# Patient Record
Sex: MALE | Race: WHITE | NOT HISPANIC OR LATINO | Employment: FULL TIME | URBAN - METROPOLITAN AREA
[De-identification: names, ages, dates, MRNs, and addresses within clinical notes are randomized per-mention and may not be internally consistent; named-entity substitution may affect disease eponyms.]

---

## 2019-01-04 ENCOUNTER — TRANSCRIBE ORDERS (OUTPATIENT)
Dept: ADMINISTRATIVE | Facility: HOSPITAL | Age: 54
End: 2019-01-04

## 2019-01-04 ENCOUNTER — APPOINTMENT (OUTPATIENT)
Dept: RADIOLOGY | Facility: CLINIC | Age: 54
End: 2019-01-04
Payer: COMMERCIAL

## 2019-01-04 DIAGNOSIS — R52 PAIN: Primary | ICD-10-CM

## 2019-01-04 DIAGNOSIS — R52 PAIN: ICD-10-CM

## 2019-01-04 PROCEDURE — 71101 X-RAY EXAM UNILAT RIBS/CHEST: CPT

## 2019-10-17 ENCOUNTER — APPOINTMENT (OUTPATIENT)
Dept: RADIOLOGY | Facility: CLINIC | Age: 54
End: 2019-10-17
Payer: COMMERCIAL

## 2019-10-17 ENCOUNTER — TRANSCRIBE ORDERS (OUTPATIENT)
Dept: ADMINISTRATIVE | Facility: HOSPITAL | Age: 54
End: 2019-10-17

## 2019-10-17 DIAGNOSIS — R05.9 COUGH: Primary | ICD-10-CM

## 2019-10-17 DIAGNOSIS — R05.9 COUGH: ICD-10-CM

## 2019-10-17 PROCEDURE — 71046 X-RAY EXAM CHEST 2 VIEWS: CPT

## 2021-02-17 ENCOUNTER — OFFICE VISIT (OUTPATIENT)
Dept: GASTROENTEROLOGY | Facility: CLINIC | Age: 56
End: 2021-02-17
Payer: COMMERCIAL

## 2021-02-17 VITALS
HEART RATE: 78 BPM | WEIGHT: 188.2 LBS | SYSTOLIC BLOOD PRESSURE: 140 MMHG | HEIGHT: 70 IN | BODY MASS INDEX: 26.94 KG/M2 | DIASTOLIC BLOOD PRESSURE: 96 MMHG

## 2021-02-17 DIAGNOSIS — R10.30 LOWER ABDOMINAL PAIN: ICD-10-CM

## 2021-02-17 DIAGNOSIS — Z86.010 HISTORY OF COLON POLYPS: Primary | ICD-10-CM

## 2021-02-17 PROCEDURE — 99243 OFF/OP CNSLTJ NEW/EST LOW 30: CPT | Performed by: INTERNAL MEDICINE

## 2021-02-17 RX ORDER — SERTRALINE HYDROCHLORIDE 100 MG/1
100 TABLET, FILM COATED ORAL DAILY
COMMUNITY
Start: 2021-01-25

## 2021-02-17 RX ORDER — ALPRAZOLAM 0.5 MG/1
0.5 TABLET ORAL AS NEEDED
COMMUNITY
Start: 2021-02-15

## 2021-02-17 NOTE — H&P (VIEW-ONLY)
[facility] - Outpatient Consultation  Jewels Holland 48 y o  male MRN: [id number]  Encounter: [id number]    ASSESSMENT AND PLAN:      1  History of colon polyps    Overdue for surveillance colonoscopy I would recommend scheduling this now  2  Lower abdominal pain    Vague lower abdominal discomfort worse with seeds and nuts better with yogurt  Improved with defecation  My index of suspicion for something serious is rather low  He is scheduled for colonoscopy  Diverticulosis is a possibility  I did recommend he resume taking fiber supplements and went over a high-fiber diet with him  Followup Appointment:   As needed  ______________________________________________________________________    Chief Complaint   Patient presents with    Abdominal Pain     left side    Colonoscopy       HPI:   Jewels Holland is a 48y o  year old male With a history of serrated adenoma of the colon 2013  He reports vague periumbilical discomfort  This seems to come and go worse with seeds and nuts better with yogurt  No weight loss  No significant rectal bleeding although he does occasionally notice small amounts of bright red blood on the toilet paper with wiping  No upper tract symptoms  Historical Information   Past Medical History:   Diagnosis Date    Anxiety     Anxiety      History reviewed  No pertinent surgical history    Social History     Substance and Sexual Activity   Alcohol Use Yes    Frequency: 4 or more times a week     Social History     Substance and Sexual Activity   Drug Use Not on file     Social History     Tobacco Use   Smoking Status Never Smoker   Smokeless Tobacco Never Used     Family History   Problem Relation Age of Onset    Dementia Father     No Known Problems Sister     No Known Problems Brother     Colon cancer Maternal Uncle     No Known Problems Sister     No Known Problems Sister     No Known Problems Brother     No Known Problems Brother     No Known Problems Brother        Meds/Allergies     Current Outpatient Medications:     ALPRAZolam (XANAX) 0 5 mg tablet    sertraline (ZOLOFT) 100 mg tablet    No Known Allergies    PHYSICAL EXAM:    Blood pressure 140/96, pulse 78, height 5' 10" (1 778 m), weight 85 4 kg (188 lb 3 2 oz)  Body mass index is 27 kg/m²  General Appearance: NAD, cooperative, alert  Eyes: Anicteric, PERRLA, EOMI  ENT:  Normocephalic, atraumatic, normal mucosa  Neck:  Supple, symmetrical, trachea midline,   Resp:  Clear to auscultation bilaterally; no rales, rhonchi or wheezing; respirations unlabored   CV:  S1 S2, Regular rate and rhythm; no murmur, rub, or gallop  GI:  Soft, non-tender, non-distended; normal bowel sounds; no masses, no organomegaly   Rectal: Deferred  Musculoskeletal: No cyanosis, clubbing or edema  Normal ROM  Skin:  No jaundice, rashes, or lesions   Heme/Lymph: No palpable cervical lymphadenopathy  Psych: Normal affect, good eye contact  Neuro: No gross deficits, AAOx3    Lab Results:   No results found for: WBC, HGB, HCT, MCV, PLT  No results found for: NA, K, CL, CO2, ANIONGAP, BUN, CREATININE, GLUCOSE, GLUF, CALCIUM, CORRECTEDCA, AST, ALT, ALKPHOS, PROT, BILITOT, EGFR  No results found for: IRON, TIBC, FERRITIN  No results found for: LIPASE    Radiology Results:   No results found  REVIEW OF SYSTEMS:    CONSTITUTIONAL: Denies any fever, chills, rigors, and weight loss  HEENT: No earache or tinnitus  Denies hearing loss or visual disturbances  CARDIOVASCULAR: No chest pain or palpitations  RESPIRATORY: Denies any cough, hemoptysis, shortness of breath or dyspnea on exertion  GASTROINTESTINAL: As noted in the History of Present Illness  GENITOURINARY: No problems with urination  Denies any hematuria or dysuria  NEUROLOGIC: No dizziness or vertigo, denies headaches  MUSCULOSKELETAL: Denies any muscle or joint pain  SKIN: Denies skin rashes or itching     ENDOCRINE: Denies excessive thirst  Denies intolerance to heat or cold  PSYCHOSOCIAL: Denies depression or anxiety  Denies any recent memory loss

## 2021-02-17 NOTE — PROGRESS NOTES
[id number] - Outpatient Consultation  Carmen Morales 48 y o  male MRN: [id number]  Encounter: [id number]    ASSESSMENT AND PLAN:      1  History of colon polyps    Overdue for surveillance colonoscopy I would recommend scheduling this now  2  Lower abdominal pain    Vague lower abdominal discomfort worse with seeds and nuts better with yogurt  Improved with defecation  My index of suspicion for something serious is rather low  He is scheduled for colonoscopy  Diverticulosis is a possibility  I did recommend he resume taking fiber supplements and went over a high-fiber diet with him  Followup Appointment:   As needed  ______________________________________________________________________    Chief Complaint   Patient presents with    Abdominal Pain     left side    Colonoscopy       HPI:   Carmen Morales is a 48y o  year old male With a history of serrated adenoma of the colon 2013  He reports vague periumbilical discomfort  This seems to come and go worse with seeds and nuts better with yogurt  No weight loss  No significant rectal bleeding although he does occasionally notice small amounts of bright red blood on the toilet paper with wiping  No upper tract symptoms  Historical Information   Past Medical History:   Diagnosis Date    Anxiety     Anxiety      History reviewed  No pertinent surgical history    Social History     Substance and Sexual Activity   Alcohol Use Yes    Frequency: 4 or more times a week     Social History     Substance and Sexual Activity   Drug Use Not on file     Social History     Tobacco Use   Smoking Status Never Smoker   Smokeless Tobacco Never Used     Family History   Problem Relation Age of Onset    Dementia Father     No Known Problems Sister     No Known Problems Brother     Colon cancer Maternal Uncle     No Known Problems Sister     No Known Problems Sister     No Known Problems Brother     No Known Problems Brother     No Known Problems Brother        Meds/Allergies     Current Outpatient Medications:     ALPRAZolam (XANAX) 0 5 mg tablet    sertraline (ZOLOFT) 100 mg tablet    No Known Allergies    PHYSICAL EXAM:    Blood pressure 140/96, pulse 78, height 5' 10" (1 778 m), weight 85 4 kg (188 lb 3 2 oz)  Body mass index is 27 kg/m²  General Appearance: NAD, cooperative, alert  Eyes: Anicteric, PERRLA, EOMI  ENT:  Normocephalic, atraumatic, normal mucosa  Neck:  Supple, symmetrical, trachea midline,   Resp:  Clear to auscultation bilaterally; no rales, rhonchi or wheezing; respirations unlabored   CV:  S1 S2, Regular rate and rhythm; no murmur, rub, or gallop  GI:  Soft, non-tender, non-distended; normal bowel sounds; no masses, no organomegaly   Rectal: Deferred  Musculoskeletal: No cyanosis, clubbing or edema  Normal ROM  Skin:  No jaundice, rashes, or lesions   Heme/Lymph: No palpable cervical lymphadenopathy  Psych: Normal affect, good eye contact  Neuro: No gross deficits, AAOx3    Lab Results:   No results found for: WBC, HGB, HCT, MCV, PLT  No results found for: NA, K, CL, CO2, ANIONGAP, BUN, CREATININE, GLUCOSE, GLUF, CALCIUM, CORRECTEDCA, AST, ALT, ALKPHOS, PROT, BILITOT, EGFR  No results found for: IRON, TIBC, FERRITIN  No results found for: LIPASE    Radiology Results:   No results found  REVIEW OF SYSTEMS:    CONSTITUTIONAL: Denies any fever, chills, rigors, and weight loss  HEENT: No earache or tinnitus  Denies hearing loss or visual disturbances  CARDIOVASCULAR: No chest pain or palpitations  RESPIRATORY: Denies any cough, hemoptysis, shortness of breath or dyspnea on exertion  GASTROINTESTINAL: As noted in the History of Present Illness  GENITOURINARY: No problems with urination  Denies any hematuria or dysuria  NEUROLOGIC: No dizziness or vertigo, denies headaches  MUSCULOSKELETAL: Denies any muscle or joint pain  SKIN: Denies skin rashes or itching     ENDOCRINE: Denies excessive thirst  Denies intolerance to heat or cold  PSYCHOSOCIAL: Denies depression or anxiety  Denies any recent memory loss

## 2021-03-08 VITALS — WEIGHT: 185 LBS | HEIGHT: 70 IN | BODY MASS INDEX: 26.48 KG/M2

## 2021-03-08 DIAGNOSIS — Z86.010 HISTORY OF COLON POLYPS: Primary | ICD-10-CM

## 2021-03-08 RX ORDER — SODIUM PICOSULFATE, MAGNESIUM OXIDE, AND ANHYDROUS CITRIC ACID 10; 3.5; 12 MG/160ML; G/160ML; G/160ML
LIQUID ORAL
Qty: 2 BOTTLE | Refills: 0 | Status: SHIPPED | OUTPATIENT
Start: 2021-03-08 | End: 2021-03-12 | Stop reason: HOSPADM

## 2021-03-08 NOTE — TELEPHONE ENCOUNTER
Why does your doctor want you to have this procedure? Hx polyps    Do you have kidney disease?  no  If yes, are you on dialysis :     Have you had diverticulitis within the past 2 months? no    Are you diabetic?  no  If yes, insulin dependent: If yes, provide diabetic instructions sheet     Do take iron supplements?  no  If yes, instruct patient to hold iron supplement for 7 days prior    Are you on a blood thinner? no   Was the blood thinner sheet complete and faxed to cardiologist no  Plavix (clopidogrel), Coumadin (warfarin), Lovenox (enoxaparin), Xarelto (rivaroxaban), Pradaxa(dabigatran), Eliquis(apixaban) Savaysa/Lixiana (edoxapan)    Do you have an automatic implantable cardiac defibrillator (AICD)/pacemaker ([facility])? no  Was AICD/pacemaker sheet completed and faxed to cardiologist? no    Are you on home oxygen? no  If yes, continuous or nocturnal:     Have you been treated for MRSA, VRE or any communicable diseases? no    Heart attack, stroke, or stent within 3 months? no  Schedule at Hospital if within 3-6 months   Use nitroglycerin for chest pain in the last 6 months? no    History of organ  transplant?  no   If yes, notify Endo      History of neck/throat/tongue surgery or cancer? no  IF yes, notify Endo      Any problems with anesthesia in the past? no     Was stool C diff ordered?  no Stool specimen needs to be completed prior to procedure    Do have any facial or body piercings?no     Do you have a latex allergy? no     Do have an allergy to metals? (Bravo study only) no     If pediatric patient, was consent faxed to pediatrician no     Patient rights reviewed yes     Colon phone prep completed; clenpiq instructions reviewed and emailed to pt; rx forwarded to provider

## 2021-03-09 NOTE — TELEPHONE ENCOUNTER
Pt's wife, Eyal Vo, states prep cost is $98/asked for alternative  Offered sample  Pt to  Clenpiq tomorrow

## 2021-03-12 ENCOUNTER — ANESTHESIA EVENT (OUTPATIENT)
Dept: GASTROENTEROLOGY | Facility: AMBULATORY SURGERY CENTER | Age: 56
End: 2021-03-12

## 2021-03-12 ENCOUNTER — ANESTHESIA (OUTPATIENT)
Dept: GASTROENTEROLOGY | Facility: AMBULATORY SURGERY CENTER | Age: 56
End: 2021-03-12

## 2021-03-12 ENCOUNTER — HOSPITAL ENCOUNTER (OUTPATIENT)
Dept: GASTROENTEROLOGY | Facility: AMBULATORY SURGERY CENTER | Age: 56
Discharge: HOME | End: 2021-03-12
Payer: COMMERCIAL

## 2021-03-12 VITALS
TEMPERATURE: 98 F | HEART RATE: 68 BPM | RESPIRATION RATE: 19 BRPM | SYSTOLIC BLOOD PRESSURE: 146 MMHG | OXYGEN SATURATION: 99 % | DIASTOLIC BLOOD PRESSURE: 95 MMHG

## 2021-03-12 DIAGNOSIS — Z86.010 HISTORY OF COLON POLYPS: ICD-10-CM

## 2021-03-12 DIAGNOSIS — R10.30 LOWER ABDOMINAL PAIN: ICD-10-CM

## 2021-03-12 PROCEDURE — 45380 COLONOSCOPY AND BIOPSY: CPT | Performed by: INTERNAL MEDICINE

## 2021-03-12 PROCEDURE — 88305 TISSUE EXAM BY PATHOLOGIST: CPT | Performed by: PATHOLOGY

## 2021-03-12 RX ORDER — PROPOFOL 10 MG/ML
INJECTION, EMULSION INTRAVENOUS AS NEEDED
Status: DISCONTINUED | OUTPATIENT
Start: 2021-03-12 | End: 2021-03-12

## 2021-03-12 RX ORDER — SODIUM CHLORIDE 9 MG/ML
50 INJECTION, SOLUTION INTRAVENOUS CONTINUOUS
Status: DISCONTINUED | OUTPATIENT
Start: 2021-03-12 | End: 2021-03-16 | Stop reason: HOSPADM

## 2021-03-12 RX ADMIN — PROPOFOL 50 MG: 10 INJECTION, EMULSION INTRAVENOUS at 09:42

## 2021-03-12 RX ADMIN — SODIUM CHLORIDE 50 ML/HR: 9 INJECTION, SOLUTION INTRAVENOUS at 09:32

## 2021-03-12 RX ADMIN — PROPOFOL 50 MG: 10 INJECTION, EMULSION INTRAVENOUS at 09:50

## 2021-03-12 RX ADMIN — PROPOFOL 150 MG: 10 INJECTION, EMULSION INTRAVENOUS at 09:39

## 2021-03-12 RX ADMIN — PROPOFOL 100 MG: 10 INJECTION, EMULSION INTRAVENOUS at 09:46

## 2021-03-12 NOTE — DISCHARGE INSTRUCTIONS
Hemorrhoids   WHAT YOU NEED TO KNOW:   What are hemorrhoids? Hemorrhoids are swollen blood vessels inside your rectum (internal hemorrhoids) or on your anus (external hemorrhoids)  Sometimes a hemorrhoid may prolapse  This means it extends out of your anus  What increases my risk for hemorrhoids? · Pregnancy or obesity    · Straining or sitting for a long time during bowel movements    · Liver disease    · Weak muscles around the anus caused by older age, rectal surgery, or anal intercourse    · A lack of physical activity    · Chronic diarrhea or constipation    · A low-fiber diet    What are the signs and symptoms of hemorrhoids? · Pain or itching around your anus or inside your rectum    · Swelling or bumps around your anus    · Bright red blood in your bowel movement, on the toilet paper, or in the toilet bowl    · Tissue bulging out of your anus (prolapsed hemorrhoids)    · Incontinence (poor control over urine or bowel movements)    How are hemorrhoids diagnosed? Your healthcare provider will ask about your symptoms, the foods you eat, and your bowel movements  He or she will examine your anus for external hemorrhoids  You may need the following:  · A digital rectal exam  is a test to check for hemorrhoids  Your healthcare provider will put a gloved finger inside your anus to feel for the hemorrhoids  · An anoscopy  is a test that uses a scope (small tube with a light and camera on the end) to look at your hemorrhoids  How are hemorrhoids treated? Treatment will depend on your symptoms  You may need any of the following:  · Medicines  can help decrease pain and swelling, and soften your bowel movement  The medicine may be a pill, pad, cream, or ointment  · Procedures  may be used to shrink or remove your hemorrhoid  Examples include rubber-band ligation, sclerotherapy, and photocoagulation  These procedures may be done in your healthcare provider's office   Ask your healthcare provider for more information about these procedures  · Surgery  may be needed to shrink or remove your hemorrhoids  How can I manage my symptoms? · Apply ice on your anus for 15 to 20 minutes every hour or as directed  Use an ice pack, or put crushed ice in a plastic bag  Cover it with a towel before you apply it to your anus  Ice helps prevent tissue damage and decreases swelling and pain  · Take a sitz bath  Fill a bathtub with 4 to 6 inches of warm water  You may also use a sitz bath pan that fits inside a toilet bowl  Sit in the sitz bath for 15 minutes  Do this 3 times a day, and after each bowel movement  The warm water can help decrease pain and swelling  · Keep your anal area clean  Gently wash the area with warm water daily  Soap may irritate the area  After a bowel movement, wipe with moist towelettes or wet toilet paper  Dry toilet paper can irritate the area  How can I help prevent hemorrhoids? · Do not strain to have a bowel movement  Do not sit on the toilet too long  These actions can increase pressure on the tissues in your rectum and anus  · Drink plenty of liquids  Liquids can help prevent constipation  Ask how much liquid to drink each day and which liquids are best for you  · Eat a variety of high-fiber foods  Examples include fruits, vegetables, and whole grains  Ask your healthcare provider how much fiber you need each day  You may need to take a fiber supplement  · Exercise as directed  Exercise, such as walking, may make it easier to have a bowel movement  Ask your healthcare provider to help you create an exercise plan  · Do not have anal sex  Anal sex can weaken the skin around your rectum and anus  · Avoid heavy lifting  This can cause straining and increase your risk for another hemorrhoid  When should I seek immediate care? · You have severe pain in your rectum or around your anus      · You have severe pain in your abdomen and you are vomiting  · You have bleeding from your anus that soaks through your underwear  When should I contact my healthcare provider? · You have frequent and painful bowel movements  · Your hemorrhoid looks or feels more swollen than usual      · You do not have a bowel movement for 2 days or more  · You see or feel tissue coming through your anus  · You have questions or concerns about your condition or care  CARE AGREEMENT:   You have the right to help plan your care  Learn about your health condition and how it may be treated  Discuss treatment options with your healthcare providers to decide what care you want to receive  You always have the right to refuse treatment  The above information is an  only  It is not intended as medical advice for individual conditions or treatments  Talk to your doctor, nurse or pharmacist before following any medical regimen to see if it is safe and effective for you  © Copyright [address] Information is for End User's use only and may not be sold, redistributed or otherwise used for commercial purposes  All illustrations and images included in CareNotes® are the copyrighted property of [employer]  or [address] [place] [address] [place]   Diverticulosis   WHAT YOU NEED TO KNOW:   What is diverticulosis? Diverticulosis is a condition that causes small pockets called diverticula to form in your intestine  These pockets make it difficult for bowel movements to pass through your digestive system  What causes diverticulosis? Diverticula form when muscles have to work hard to move bowel movements through the intestine  The force causes bulges to form at weak areas in the intestine  This may happen if you eat foods that are low in fiber  Fiber helps give your bowel movements more bulk so they are larger and easier to move through your colon   The following may increase your risk of diverticulosis:  · A history of constipation    · Age 36 or older    · Obesity    · Lack of exercise    What are the signs and symptoms of diverticulosis? Diverticulosis usually does not cause any signs or symptoms  It may cause any of the following in some people:  · Pain or discomfort in your lower abdomen    · Abdominal bloating    · Constipation or diarrhea    How is diverticulosis diagnosed? Your healthcare provider will examine you and ask about your bowel movements, diet, and symptoms  He or she will also ask about any medical conditions you have or medicines you take  You may need any of the following:  · Blood tests  may be done to check for signs of inflammation  · A barium enema  is an x-ray of your colon that may show diverticula  A tube is put into your anus, and a liquid called barium is put through the tube  Barium is used so that healthcare providers can see your colon more clearly  · Flexible sigmoidoscopy  is a test to look for any changes in your lower intestines and rectum  It may also show the cause of any bleeding or pain  A soft, bendable tube with a light on the end will be put into your anus  It will then be moved forward into your intestine  · A colonoscopy  is used to look at your whole colon  A scope (long bendable tube with a light on the end) is used to take pictures  This test may show diverticula  · A CT scan , or CAT scan, may show diverticula  You may be given contrast liquid before the scan  Tell the healthcare provider if you have ever had an allergic reaction to contrast liquid  How is diverticulosis managed? The goal of treatment is to manage any symptoms you have and prevent other problems such as diverticulitis  Diverticulitis is swelling or infection of the diverticula  Your healthcare provider may recommend any of the following:  · Eat a variety of high-fiber foods  High-fiber foods help you have regular bowel movements  High-fiber foods include cooked beans, fruits, vegetables, and some cereals   Most adults need 25 to 35 grams of fiber each day  Your healthcare provider may recommend that you have more  Ask your healthcare provider how much fiber you need  Increase fiber slowly  You may have abdominal discomfort, bloating, and gas if you add fiber to your diet too quickly  You may need to take a fiber supplement if you are not getting enough fiber from food  · Medicines  to soften your bowel movements may be given  You may also need medicines to treat symptoms such as bloating and pain  · Drink liquids as directed  You may need to drink 2 to 3 liters (8 to 12 cups) of liquids every day  Ask your healthcare provider how much liquid to drink each day and which liquids are best for you  · Apply heat  on your abdomen for 20 to 30 minutes every 2 hours for as many days as directed  Heat helps decrease pain and muscle spasms  How can I help prevent diverticulitis or other symptoms? The following may help decrease your risk for diverticulitis or symptoms, such as bleeding  Talk to your provider about these or other things you can do to prevent problems that may occur with diverticulosis  · Exercise regularly  Ask your healthcare provider about the best exercise plan for you  Exercise can help you have regular bowel movements  Get 30 minutes of exercise on most days of the week  · Maintain a healthy weight  Ask your healthcare provider how much you should weigh  Ask him or her to help you create a weight loss plan if you are overweight  · Do not smoke  Nicotine and other chemicals in cigarettes increase your risk for diverticulitis  Ask your healthcare provider for information if you currently smoke and need help to quit  E-cigarettes or smokeless tobacco still contain nicotine  Talk to your healthcare provider before you use these products  · Ask your healthcare provider if it is safe to take NSAIDs  NSAIDs may increase your risk of diverticulitis  When should I seek immediate care? · You have severe pain on the left side of your lower abdomen  · Your bowel movements are bright or dark red  When should I contact my healthcare provider? · You have a fever and chills  · You feel dizzy or lightheaded  · You have nausea, or you are vomiting  · You have a change in your bowel movements  · You have questions or concerns about your condition or care  CARE AGREEMENT:   You have the right to help plan your care  Learn about your health condition and how it may be treated  Discuss treatment options with your healthcare providers to decide what care you want to receive  You always have the right to refuse treatment  The above information is an  only  It is not intended as medical advice for individual conditions or treatments  Talk to your doctor, nurse or pharmacist before following any medical regimen to see if it is safe and effective for you  © Copyright [address] Information is for End User's use only and may not be sold, redistributed or otherwise used for commercial purposes   All illustrations and images included in CareNotes® are the copyrighted property of [employer]  or [address] [place] [address]

## 2021-03-12 NOTE — ANESTHESIA POSTPROCEDURE EVALUATION
Post-Op Assessment Note    CV Status:  Stable  Pain Score: 0    Pain management: adequate     Mental Status:  Alert and awake   Hydration Status:  Euvolemic   PONV Controlled:  Controlled   Airway Patency:  Patent      Post Op Vitals Reviewed: Yes      Staff: Anesthesiologist         No complications documented      /63 (03/12/21 0958)    Temp      Pulse 71 (03/12/21 0958)   Resp 15 (03/12/21 0958)    SpO2 98 % (03/12/21 0958)

## 2021-03-12 NOTE — INTERVAL H&P NOTE
H&P reviewed  After examining the patient I find no changes in the patients condition since the H&P had been written      Vitals:    03/12/21 0920   BP: 158/91   Pulse: 88   Resp: 22   Temp: 98 °F (36 7 °C)   SpO2: 99%

## 2021-03-12 NOTE — ANESTHESIA PREPROCEDURE EVALUATION
Procedure:  COLONOSCOPY    Relevant Problems   NEURO/PSYCH   (+) Anxiety      Other   (+) Colon polyp        Physical Exam    Airway    Mallampati score: III  TM Distance: >3 FB  Neck ROM: full     Dental   Comment: caps,     Cardiovascular  Rhythm: regular, Rate: normal,     Pulmonary  Pulmonary exam normal Breath sounds clear to auscultation,     Other Findings        Anesthesia Plan  ASA Score- 2     Anesthesia Type- IV sedation with anesthesia with ASA Monitors  Additional Monitors:   Airway Plan:           Plan Factors-    Chart reviewed  Patient is not a current smoker  Induction- intravenous  Postoperative Plan-     Informed Consent- Anesthetic plan and risks discussed with patient

## 2021-03-19 NOTE — RESULT ENCOUNTER NOTE
Biopsies negative  I left a voicemail for the patient  Five year colonoscopy recall history of polyps

## 2021-04-21 ENCOUNTER — IMMUNIZATIONS (OUTPATIENT)
Dept: FAMILY MEDICINE CLINIC | Facility: HOSPITAL | Age: 56
End: 2021-04-21

## 2021-04-21 DIAGNOSIS — Z23 ENCOUNTER FOR IMMUNIZATION: Primary | ICD-10-CM

## 2021-04-21 PROCEDURE — 0001A SARS-COV-2 / COVID-19 MRNA VACCINE (PFIZER-BIONTECH) 30 MCG: CPT

## 2021-04-21 PROCEDURE — 91300 SARS-COV-2 / COVID-19 MRNA VACCINE (PFIZER-BIONTECH) 30 MCG: CPT

## 2021-05-17 ENCOUNTER — IMMUNIZATIONS (OUTPATIENT)
Dept: FAMILY MEDICINE CLINIC | Facility: HOSPITAL | Age: 56
End: 2021-05-17

## 2021-05-17 DIAGNOSIS — Z23 ENCOUNTER FOR IMMUNIZATION: Primary | ICD-10-CM

## 2021-05-17 PROCEDURE — 91300 SARS-COV-2 / COVID-19 MRNA VACCINE (PFIZER-BIONTECH) 30 MCG: CPT

## 2021-05-17 PROCEDURE — 0002A SARS-COV-2 / COVID-19 MRNA VACCINE (PFIZER-BIONTECH) 30 MCG: CPT

## 2021-06-19 ENCOUNTER — HOSPITAL ENCOUNTER (EMERGENCY)
Facility: HOSPITAL | Age: 56
Discharge: HOME | End: 2021-06-19
Attending: EMERGENCY MEDICINE | Admitting: EMERGENCY MEDICINE
Payer: COMMERCIAL

## 2021-06-19 ENCOUNTER — APPOINTMENT (EMERGENCY)
Dept: CT IMAGING | Facility: HOSPITAL | Age: 56
End: 2021-06-19
Payer: COMMERCIAL

## 2021-06-19 VITALS
DIASTOLIC BLOOD PRESSURE: 81 MMHG | OXYGEN SATURATION: 97 % | WEIGHT: 186.07 LBS | HEART RATE: 83 BPM | SYSTOLIC BLOOD PRESSURE: 163 MMHG | TEMPERATURE: 99.3 F | BODY MASS INDEX: 26.7 KG/M2 | RESPIRATION RATE: 16 BRPM

## 2021-06-19 DIAGNOSIS — S09.90XA INJURY OF HEAD, INITIAL ENCOUNTER: ICD-10-CM

## 2021-06-19 DIAGNOSIS — M62.838 TRAPEZIUS MUSCLE SPASM: ICD-10-CM

## 2021-06-19 DIAGNOSIS — E04.1 THYROID NODULE: ICD-10-CM
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
## 2021-06-19 DIAGNOSIS — M54.2 NECK PAIN: ICD-10-CM

## 2021-06-19 DIAGNOSIS — S00.81XA ABRASION OF FOREHEAD, INITIAL ENCOUNTER: ICD-10-CM

## 2021-06-19 PROCEDURE — 70450 CT HEAD/BRAIN W/O DYE: CPT

## 2021-06-19 PROCEDURE — 99282 EMERGENCY DEPT VISIT SF MDM: CPT | Performed by: PHYSICIAN ASSISTANT

## 2021-06-19 PROCEDURE — 99284 EMERGENCY DEPT VISIT MOD MDM: CPT

## 2021-06-19 PROCEDURE — 72125 CT NECK SPINE W/O DYE: CPT

## 2021-06-19 RX ORDER — BACITRACIN, NEOMYCIN, POLYMYXIN B 400; 3.5; 5 [USP'U]/G; MG/G; [USP'U]/G
1 OINTMENT TOPICAL ONCE
Status: COMPLETED | OUTPATIENT
Start: 2021-06-19 | End: 2021-06-19

## 2021-06-19 RX ADMIN — BACITRACIN ZINC, NEOMYCIN, POLYMYXIN B SULFAT 1 SMALL APPLICATION: 5000; 3.5; 4 OINTMENT TOPICAL at 22:22

## 2021-06-20 NOTE — DISCHARGE INSTRUCTIONS
Area clean and dry  Gently wash with soap and water  Apply Neosporin 2 times a day  Keep the area covered and protected from the sun  Continue Tylenol or ibuprofen at home as needed for pain  Can apply ice to the area as needed for pain, swelling  Continue previously prescribed Flexeril as needed for muscle spasms    Stay hydrated, drink plenty of fluids  Follow-up with PCP for monitoring of symptoms  Return to ED if symptoms worsen including increasing neck pain, numbness, tingling, weakness of the arms, severe headache, vision changes, dizziness, nausea, vomiting, swelling, redness, pus draining from the wound, fevers
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
## 2022-03-12 ENCOUNTER — APPOINTMENT (OUTPATIENT)
Dept: LAB | Facility: CLINIC | Age: 57
End: 2022-03-12
Payer: COMMERCIAL

## 2022-03-12 DIAGNOSIS — E78.5 HYPERLIPIDEMIA, UNSPECIFIED HYPERLIPIDEMIA TYPE: ICD-10-CM

## 2022-03-12 DIAGNOSIS — N40.0 BENIGN PROSTATIC HYPERPLASIA, UNSPECIFIED WHETHER LOWER URINARY TRACT SYMPTOMS PRESENT: ICD-10-CM

## 2022-03-12 LAB
ALBUMIN SERPL BCP-MCNC: 3.8 G/DL (ref 3.5–5)
ALP SERPL-CCNC: 63 U/L (ref 46–116)
ALT SERPL W P-5'-P-CCNC: 44 U/L (ref 12–78)
ANION GAP SERPL CALCULATED.3IONS-SCNC: 4 MMOL/L (ref 4–13)
AST SERPL W P-5'-P-CCNC: 22 U/L (ref 5–45)
BASOPHILS # BLD AUTO: 0.05 THOUSANDS/ΜL (ref 0–0.1)
BASOPHILS NFR BLD AUTO: 1 % (ref 0–1)
BILIRUB SERPL-MCNC: 0.52 MG/DL (ref 0.2–1)
BUN SERPL-MCNC: 15 MG/DL (ref 5–25)
CALCIUM SERPL-MCNC: 8.6 MG/DL (ref 8.3–10.1)
CHLORIDE SERPL-SCNC: 112 MMOL/L (ref 100–108)
CHOLEST SERPL-MCNC: 182 MG/DL
CO2 SERPL-SCNC: 27 MMOL/L (ref 21–32)
CREAT SERPL-MCNC: 0.95 MG/DL (ref 0.6–1.3)
EOSINOPHIL # BLD AUTO: 0.12 THOUSAND/ΜL (ref 0–0.61)
EOSINOPHIL NFR BLD AUTO: 3 % (ref 0–6)
ERYTHROCYTE [DISTWIDTH] IN BLOOD BY AUTOMATED COUNT: 13.2 % (ref 11.6–15.1)
GFR SERPL CREATININE-BSD FRML MDRD: 89 ML/MIN/1.73SQ M
GLUCOSE P FAST SERPL-MCNC: 92 MG/DL (ref 65–99)
HCT VFR BLD AUTO: 47.6 % (ref 36.5–49.3)
HDLC SERPL-MCNC: 52 MG/DL
HGB BLD-MCNC: 15.2 G/DL (ref 12–17)
IMM GRANULOCYTES # BLD AUTO: 0.01 THOUSAND/UL (ref 0–0.2)
IMM GRANULOCYTES NFR BLD AUTO: 0 % (ref 0–2)
LDLC SERPL CALC-MCNC: 115 MG/DL (ref 0–100)
LYMPHOCYTES # BLD AUTO: 1.28 THOUSANDS/ΜL (ref 0.6–4.47)
LYMPHOCYTES NFR BLD AUTO: 34 % (ref 14–44)
MCH RBC QN AUTO: 30.5 PG (ref 26.8–34.3)
MCHC RBC AUTO-ENTMCNC: 31.9 G/DL (ref 31.4–37.4)
MCV RBC AUTO: 95 FL (ref 82–98)
MONOCYTES # BLD AUTO: 0.37 THOUSAND/ΜL (ref 0.17–1.22)
MONOCYTES NFR BLD AUTO: 10 % (ref 4–12)
NEUTROPHILS # BLD AUTO: 1.98 THOUSANDS/ΜL (ref 1.85–7.62)
NEUTS SEG NFR BLD AUTO: 52 % (ref 43–75)
NONHDLC SERPL-MCNC: 130 MG/DL
NRBC BLD AUTO-RTO: 0 /100 WBCS
PLATELET # BLD AUTO: 183 THOUSANDS/UL (ref 149–390)
PMV BLD AUTO: 11.7 FL (ref 8.9–12.7)
POTASSIUM SERPL-SCNC: 4.2 MMOL/L (ref 3.5–5.3)
PROT SERPL-MCNC: 6.9 G/DL (ref 6.4–8.2)
PSA SERPL-MCNC: 0.3 NG/ML (ref 0–4)
RBC # BLD AUTO: 4.99 MILLION/UL (ref 3.88–5.62)
SODIUM SERPL-SCNC: 143 MMOL/L (ref 136–145)
TRIGL SERPL-MCNC: 73 MG/DL
WBC # BLD AUTO: 3.81 THOUSAND/UL (ref 4.31–10.16)

## 2022-03-12 PROCEDURE — 85025 COMPLETE CBC W/AUTO DIFF WBC: CPT

## 2022-03-12 PROCEDURE — 36415 COLL VENOUS BLD VENIPUNCTURE: CPT

## 2022-03-12 PROCEDURE — 80053 COMPREHEN METABOLIC PANEL: CPT

## 2022-03-12 PROCEDURE — 80061 LIPID PANEL: CPT

## 2022-03-12 PROCEDURE — G0103 PSA SCREENING: HCPCS

## 2023-02-08 ENCOUNTER — APPOINTMENT (OUTPATIENT)
Dept: LAB | Facility: CLINIC | Age: 58
End: 2023-02-08

## 2023-02-08 DIAGNOSIS — N40.0 ENLARGED PROSTATE: ICD-10-CM

## 2023-02-08 DIAGNOSIS — Z00.01 ENCOUNTER FOR GENERAL ADULT MEDICAL EXAMINATION WITH ABNORMAL FINDINGS: ICD-10-CM

## 2023-02-08 DIAGNOSIS — E78.5 HYPERLIPIDEMIA, UNSPECIFIED HYPERLIPIDEMIA TYPE: ICD-10-CM

## 2023-02-08 LAB
ALBUMIN SERPL BCP-MCNC: 4 G/DL (ref 3.5–5)
ALP SERPL-CCNC: 59 U/L (ref 46–116)
ALT SERPL W P-5'-P-CCNC: 37 U/L (ref 12–78)
ANION GAP SERPL CALCULATED.3IONS-SCNC: 5 MMOL/L (ref 4–13)
AST SERPL W P-5'-P-CCNC: 21 U/L (ref 5–45)
BASOPHILS # BLD AUTO: 0.05 THOUSANDS/ÂΜL (ref 0–0.1)
BASOPHILS NFR BLD AUTO: 1 % (ref 0–1)
BILIRUB SERPL-MCNC: 0.81 MG/DL (ref 0.2–1)
BUN SERPL-MCNC: 17 MG/DL (ref 5–25)
CALCIUM SERPL-MCNC: 9.2 MG/DL (ref 8.3–10.1)
CHLORIDE SERPL-SCNC: 111 MMOL/L (ref 96–108)
CHOLEST SERPL-MCNC: 201 MG/DL
CO2 SERPL-SCNC: 26 MMOL/L (ref 21–32)
CREAT SERPL-MCNC: 0.92 MG/DL (ref 0.6–1.3)
EOSINOPHIL # BLD AUTO: 0.11 THOUSAND/ÂΜL (ref 0–0.61)
EOSINOPHIL NFR BLD AUTO: 3 % (ref 0–6)
ERYTHROCYTE [DISTWIDTH] IN BLOOD BY AUTOMATED COUNT: 13.1 % (ref 11.6–15.1)
GFR SERPL CREATININE-BSD FRML MDRD: 92 ML/MIN/1.73SQ M
GLUCOSE P FAST SERPL-MCNC: 102 MG/DL (ref 65–99)
HCT VFR BLD AUTO: 49.6 % (ref 36.5–49.3)
HDLC SERPL-MCNC: 49 MG/DL
HGB BLD-MCNC: 16.3 G/DL (ref 12–17)
IMM GRANULOCYTES # BLD AUTO: 0.01 THOUSAND/UL (ref 0–0.2)
IMM GRANULOCYTES NFR BLD AUTO: 0 % (ref 0–2)
LDLC SERPL CALC-MCNC: 114 MG/DL (ref 0–100)
LYMPHOCYTES # BLD AUTO: 1.39 THOUSANDS/ÂΜL (ref 0.6–4.47)
LYMPHOCYTES NFR BLD AUTO: 33 % (ref 14–44)
MCH RBC QN AUTO: 30.6 PG (ref 26.8–34.3)
MCHC RBC AUTO-ENTMCNC: 32.9 G/DL (ref 31.4–37.4)
MCV RBC AUTO: 93 FL (ref 82–98)
MONOCYTES # BLD AUTO: 0.33 THOUSAND/ÂΜL (ref 0.17–1.22)
MONOCYTES NFR BLD AUTO: 8 % (ref 4–12)
NEUTROPHILS # BLD AUTO: 2.3 THOUSANDS/ÂΜL (ref 1.85–7.62)
NEUTS SEG NFR BLD AUTO: 55 % (ref 43–75)
NRBC BLD AUTO-RTO: 0 /100 WBCS
PLATELET # BLD AUTO: 206 THOUSANDS/UL (ref 149–390)
PMV BLD AUTO: 11.5 FL (ref 8.9–12.7)
POTASSIUM SERPL-SCNC: 4.2 MMOL/L (ref 3.5–5.3)
PROT SERPL-MCNC: 7.5 G/DL (ref 6.4–8.4)
PSA SERPL-MCNC: 0.3 NG/ML (ref 0–4)
RBC # BLD AUTO: 5.33 MILLION/UL (ref 3.88–5.62)
SODIUM SERPL-SCNC: 142 MMOL/L (ref 135–147)
TRIGL SERPL-MCNC: 189 MG/DL
WBC # BLD AUTO: 4.19 THOUSAND/UL (ref 4.31–10.16)

## 2023-02-13 ENCOUNTER — APPOINTMENT (EMERGENCY)
Dept: CT IMAGING | Facility: HOSPITAL | Age: 58
End: 2023-02-13

## 2023-02-13 ENCOUNTER — HOSPITAL ENCOUNTER (EMERGENCY)
Facility: HOSPITAL | Age: 58
Discharge: HOME | End: 2023-02-13
Attending: EMERGENCY MEDICINE

## 2023-02-13 VITALS
BODY MASS INDEX: 26.26 KG/M2 | OXYGEN SATURATION: 96 % | RESPIRATION RATE: 16 BRPM | DIASTOLIC BLOOD PRESSURE: 94 MMHG | TEMPERATURE: 98.4 F | SYSTOLIC BLOOD PRESSURE: 147 MMHG | HEART RATE: 76 BPM | WEIGHT: 182.98 LBS

## 2023-02-13 DIAGNOSIS — R10.12 LEFT UPPER QUADRANT ABDOMINAL PAIN: Primary | ICD-10-CM

## 2023-02-13 LAB
ALBUMIN SERPL BCP-MCNC: 4.8 G/DL (ref 3.5–5)
ALP SERPL-CCNC: 60 U/L (ref 34–104)
ALT SERPL W P-5'-P-CCNC: 24 U/L (ref 7–52)
ANION GAP SERPL CALCULATED.3IONS-SCNC: 8 MMOL/L (ref 4–13)
AST SERPL W P-5'-P-CCNC: 19 U/L (ref 13–39)
BASOPHILS # BLD AUTO: 0.06 THOUSANDS/ÂΜL (ref 0–0.1)
BASOPHILS NFR BLD AUTO: 1 % (ref 0–1)
BILIRUB SERPL-MCNC: 0.55 MG/DL (ref 0.2–1)
BILIRUB UR QL STRIP: NEGATIVE
BUN SERPL-MCNC: 17 MG/DL (ref 5–25)
CALCIUM SERPL-MCNC: 9.5 MG/DL (ref 8.4–10.2)
CHLORIDE SERPL-SCNC: 106 MMOL/L (ref 96–108)
CLARITY UR: CLEAR
CO2 SERPL-SCNC: 27 MMOL/L (ref 21–32)
COLOR UR: YELLOW
CREAT SERPL-MCNC: 1.06 MG/DL (ref 0.6–1.3)
EOSINOPHIL # BLD AUTO: 0.08 THOUSAND/ÂΜL (ref 0–0.61)
EOSINOPHIL NFR BLD AUTO: 1 % (ref 0–6)
ERYTHROCYTE [DISTWIDTH] IN BLOOD BY AUTOMATED COUNT: 12.9 % (ref 11.6–15.1)
GFR SERPL CREATININE-BSD FRML MDRD: 78 ML/MIN/1.73SQ M
GLUCOSE SERPL-MCNC: 93 MG/DL (ref 65–140)
GLUCOSE UR STRIP-MCNC: NEGATIVE MG/DL
HCT VFR BLD AUTO: 48.3 % (ref 36.5–49.3)
HGB BLD-MCNC: 15.9 G/DL (ref 12–17)
HGB UR QL STRIP.AUTO: NEGATIVE
IMM GRANULOCYTES # BLD AUTO: 0.02 THOUSAND/UL (ref 0–0.2)
IMM GRANULOCYTES NFR BLD AUTO: 0 % (ref 0–2)
KETONES UR STRIP-MCNC: NEGATIVE MG/DL
LEUKOCYTE ESTERASE UR QL STRIP: NEGATIVE
LIPASE SERPL-CCNC: 20 U/L (ref 11–82)
LYMPHOCYTES # BLD AUTO: 1.88 THOUSANDS/ÂΜL (ref 0.6–4.47)
LYMPHOCYTES NFR BLD AUTO: 27 % (ref 14–44)
MCH RBC QN AUTO: 30.3 PG (ref 26.8–34.3)
MCHC RBC AUTO-ENTMCNC: 32.9 G/DL (ref 31.4–37.4)
MCV RBC AUTO: 92 FL (ref 82–98)
MONOCYTES # BLD AUTO: 0.43 THOUSAND/ÂΜL (ref 0.17–1.22)
MONOCYTES NFR BLD AUTO: 6 % (ref 4–12)
NEUTROPHILS # BLD AUTO: 4.51 THOUSANDS/ÂΜL (ref 1.85–7.62)
NEUTS SEG NFR BLD AUTO: 65 % (ref 43–75)
NITRITE UR QL STRIP: NEGATIVE
NRBC BLD AUTO-RTO: 0 /100 WBCS
PH UR STRIP.AUTO: 5.5 [PH] (ref 4.5–8)
PLATELET # BLD AUTO: 203 THOUSANDS/UL (ref 149–390)
PMV BLD AUTO: 10.8 FL (ref 8.9–12.7)
POTASSIUM SERPL-SCNC: 4.1 MMOL/L (ref 3.5–5.3)
PROT SERPL-MCNC: 7.7 G/DL (ref 6.4–8.4)
PROT UR STRIP-MCNC: NEGATIVE MG/DL
RBC # BLD AUTO: 5.24 MILLION/UL (ref 3.88–5.62)
SODIUM SERPL-SCNC: 141 MMOL/L (ref 135–147)
SP GR UR STRIP.AUTO: >=1.03 (ref 1–1.03)
UROBILINOGEN UR QL STRIP.AUTO: 0.2 E.U./DL
WBC # BLD AUTO: 6.98 THOUSAND/UL (ref 4.31–10.16)

## 2023-02-13 RX ORDER — KETOROLAC TROMETHAMINE 30 MG/ML
30 INJECTION, SOLUTION INTRAMUSCULAR; INTRAVENOUS ONCE
Status: COMPLETED | OUTPATIENT
Start: 2023-02-13 | End: 2023-02-13

## 2023-02-13 RX ORDER — LIDOCAINE 50 MG/G
1 PATCH TOPICAL ONCE
Status: DISCONTINUED | OUTPATIENT
Start: 2023-02-13 | End: 2023-02-13 | Stop reason: HOSPADM

## 2023-02-13 RX ADMIN — LIDOCAINE 1 PATCH: 50 PATCH TOPICAL at 18:24

## 2023-02-13 RX ADMIN — KETOROLAC TROMETHAMINE 30 MG: 30 INJECTION, SOLUTION INTRAMUSCULAR; INTRAVENOUS at 18:22

## 2023-02-13 RX ADMIN — IOHEXOL 100 ML: 350 INJECTION, SOLUTION INTRAVENOUS at 19:10

## 2023-02-14 NOTE — ED PROVIDER NOTES
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 Taking? ALPRAZolam (XANAX) 0 5 mg tablet 2/13/2023  Yes Yes   Sig: Take 0 5 mg by mouth as needed   sertraline (ZOLOFT) 100 mg tablet Past Week  Yes Yes   Sig: Take 100 mg by mouth daily      Facility-Administered Medications: None       Past Medical History:   Diagnosis Date   • Anxiety    • Anxiety    • Colon polyp        Past Surgical History:   Procedure Laterality Date   • COLONOSCOPY         Family History   Problem Relation Age of Onset   • Dementia Father    • No Known Problems Sister    • No Known Problems Brother    • Colon cancer Maternal Uncle    • No Known Problems Sister    • No Known Problems Sister    • No Known Problems Brother    • No Known Problems Brother    • No Known Problems Brother      I have reviewed and agree with the history as documented  E-Cigarette/Vaping     E-Cigarette/Vaping Substances   • Nicotine No    • THC No    • CBD No    • Flavoring No    • Other No    • Unknown No      Social History     Tobacco Use   • Smoking status: Never   • Smokeless tobacco: Never   Substance Use Topics   • Alcohol use:  Yes   • Drug use: Never       Review of Systems Constitutional: Negative for chills and fever  HENT: Negative for rhinorrhea and sore throat  Eyes: Negative for photophobia and visual disturbance  Respiratory: Negative for cough and shortness of breath  Cardiovascular: Negative for chest pain and palpitations  Gastrointestinal: Positive for abdominal pain  Negative for diarrhea, nausea and vomiting  Genitourinary: Negative for difficulty urinating, dysuria, flank pain and hematuria  Musculoskeletal: Negative for arthralgias  Neurological: Negative for dizziness, syncope, light-headedness and headaches  Physical Exam  Physical Exam  Vitals and nursing note reviewed  Constitutional:       General: He is not in acute distress  Appearance: He is well-developed  He is not ill-appearing  Comments: Well-appearing patient on exam   Does not appear to be in any acute distress  HENT:      Head: Normocephalic and atraumatic  Eyes:      Conjunctiva/sclera: Conjunctivae normal    Cardiovascular:      Rate and Rhythm: Normal rate and regular rhythm  Heart sounds: No murmur heard  Pulmonary:      Effort: Pulmonary effort is normal  No respiratory distress  Breath sounds: Normal breath sounds  Abdominal:      General: Abdomen is flat  There is no distension  Palpations: Abdomen is soft  Tenderness: There is abdominal tenderness in the left upper quadrant and left lower quadrant  Musculoskeletal:         General: No swelling  Cervical back: Neck supple  Skin:     General: Skin is warm and dry  Capillary Refill: Capillary refill takes less than 2 seconds  Neurological:      General: No focal deficit present  Mental Status: He is alert and oriented to person, place, and time     Psychiatric:         Mood and Affect: Mood normal          Vital Signs  ED Triage Vitals   Temperature Pulse Respirations Blood Pressure SpO2   02/13/23 1615 02/13/23 1615 02/13/23 1615 02/13/23 1615 02/13/23 1615   98 4 °F (36 9 °C) (!) 51 16 (!) 147/106 96 %      Temp Source Heart Rate Source Patient Position - Orthostatic VS BP Location FiO2 (%)   02/13/23 1615 02/13/23 1615 02/13/23 1827 02/13/23 1827 --   Oral Monitor Lying Left arm       Pain Score       02/13/23 1615       8           Vitals:    02/13/23 1615 02/13/23 1827 02/13/23 1931   BP: (!) 147/106 148/100 147/94   Pulse: (!) 51 71 76   Patient Position - Orthostatic VS:  Lying Lying         Visual Acuity      ED Medications  Medications   lidocaine (LIDODERM) 5 % patch 1 patch (1 patch Topical Medication Applied 2/13/23 1824)   ketorolac (TORADOL) injection 30 mg (30 mg Intravenous Given 2/13/23 1822)   iohexol (OMNIPAQUE) 350 MG/ML injection (SINGLE-DOSE) 100 mL (100 mL Intravenous Given 2/13/23 1910)       Diagnostic Studies  Results Reviewed     Procedure Component Value Units Date/Time    Lipase [[id number]]  (Normal) Collected: 02/13/23 1623    Lab Status: Final result Specimen: Blood from Arm, Left Updated: 02/13/23 1814     Lipase 20 u/L     Comprehensive metabolic panel [[id number]] Collected: 02/13/23 1623    Lab Status: Final result Specimen: Blood from Arm, Left Updated: 02/13/23 1759     Sodium 141 mmol/L      Potassium 4 1 mmol/L      Chloride 106 mmol/L      CO2 27 mmol/L      ANION GAP 8 mmol/L      BUN 17 mg/dL      Creatinine 1 06 mg/dL      Glucose 93 mg/dL      Calcium 9 5 mg/dL      AST 19 U/L      ALT 24 U/L      Alkaline Phosphatase 60 U/L      Total Protein 7 7 g/dL      Albumin 4 8 g/dL      Total Bilirubin 0 55 mg/dL      eGFR 78 ml/min/1 73sq m     Narrative:      Marisa guidelines for Chronic Kidney Disease (CKD):   •  Stage 1 with normal or high GFR (GFR > 90 mL/min/1 73 square meters)  •  Stage 2 Mild CKD (GFR = 60-89 mL/min/1 73 square meters)  •  Stage 3A Moderate CKD (GFR = 45-59 mL/min/1 73 square meters)  •  Stage 3B Moderate CKD (GFR = 30-44 mL/min/1 73 square meters)  •  Stage 4 Severe CKD (GFR = 15-29 mL/min/1 73 square meters)  •  Stage 5 End Stage CKD (GFR <15 mL/min/1 73 square meters)  Note: GFR calculation is accurate only with a steady state creatinine    CBC and differential [[id number]] Collected: 02/13/23 1623    Lab Status: Final result Specimen: Blood from Arm, Left Updated: 02/13/23 1639     WBC 6 98 Thousand/uL      RBC 5 24 Million/uL      Hemoglobin 15 9 g/dL      Hematocrit 48 3 %      MCV 92 fL      MCH 30 3 pg      MCHC 32 9 g/dL      RDW 12 9 %      MPV 10 8 fL      Platelets 615 Thousands/uL      nRBC 0 /100 WBCs      Neutrophils Relative 65 %      Immat GRANS % 0 %      Lymphocytes Relative 27 %      Monocytes Relative 6 %      Eosinophils Relative 1 %      Basophils Relative 1 %      Neutrophils Absolute 4 51 Thousands/µL      Immature Grans Absolute 0 02 Thousand/uL      Lymphocytes Absolute 1 88 Thousands/µL      Monocytes Absolute 0 43 Thousand/µL      Eosinophils Absolute 0 08 Thousand/µL      Basophils Absolute 0 06 Thousands/µL     Urine Macroscopic, POC [[id number]] Collected: 02/13/23 1627    Lab Status: Final result Specimen: Urine Updated: 02/13/23 1629     Color, UA Yellow     Clarity, UA Clear     pH, UA 5 5     Leukocytes, UA Negative     Nitrite, UA Negative     Protein, UA Negative mg/dl      Glucose, UA Negative mg/dl      Ketones, UA Negative mg/dl      Urobilinogen, UA 0 2 E U /dl      Bilirubin, UA Negative     Occult Blood, UA Negative     Specific Gravity, UA >=1 030    Narrative:      [facility] RESULT                 CT abdomen pelvis with contrast   Final Result by Lennart Duverney, MD (02/13 1957)      No acute pathology  Workstation performed: [id number]                    Procedures  Procedures         ED Course  ED Course as of 02/13/23 2018 Mon Feb 13, 2023 2005 Patient reports improvement in pain status post IV Toradol and lidocaine patch                                               Medical Decision Making  Patient presents to the ED for evaluation of left-sided abdominal pain that began this morning  He denies any systemic symptoms including NVD, dysuria, fevers or chills  Labs unremarkable in ED, CT abdomen and pelvis with contrast unremarkable for acute pathologies  Patient pain improved with IV Toradol and lidocaine patch over area of pain  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, states states pain is worse with positional movements  At this time it is possible that his pain is musculoskeletal in origin  Patient advised to follow-up with PCP for further evaluation and management  ED return precautions discussed  Patient verbalizes understanding and agreement with plan  Left upper quadrant abdominal pain: acute illness or injury  Amount and/or Complexity of Data Reviewed  Labs: ordered  Radiology: ordered  Risk  Prescription drug management  Disposition  Final diagnoses:   Left upper quadrant abdominal pain     Time reflects when diagnosis was documented in both MDM as applicable and the Disposition within this note     Time User Action Codes Description Comment    2/13/2023  8:08 PM Maureen Mazariegos Add [R10 12] Left upper quadrant abdominal pain       ED Disposition     ED Disposition   Discharge    Condition   Stable    Date/Time   Mon Feb 13, 2023  8:08 PM    Comment   Paul Failing discharge to home/self care  Follow-up Information     Follow up With Specialties Details Why Contact Info    Jamel Rojas,  Family Medicine Schedule an appointment as soon as possible for a visit  For re-check [address] [facility] [address] [phone]            Discharge Medication List as of 2/13/2023  8:08 PM      CONTINUE these medications which have NOT CHANGED    Details   ALPRAZolam (XANAX) 0 5 mg tablet Take 0 5 mg by mouth as needed, Starting Mon 2/15/2021, Historical Med      sertraline (ZOLOFT) 100 mg tablet Take 100 mg by mouth daily, Starting Mon 1/25/2021, Historical Med             No discharge procedures on file      PDMP Review     None          ED Provider  Electronically Signed by           Samara Lynch PA-C  02/13/23 2018

## 2023-02-14 NOTE — DISCHARGE INSTRUCTIONS
Take 1000 mg of acetaminophen (Tylenol) with 400 mg of ibuprofen (Advil) every 8 hours as needed for pain  Lidocaine patches are available over-the-counter can be found in the back pain aisle of most pharmacies  Look for 4% lidocaine in the list of the active ingredients  These patches can be placed for 12 hours  After 12 hours, discard the patch  The next patch can be placed 12 hours later  Follow-up with your family doctor for further evaluation management if your symptoms continue  Return to the ED if you develop any worsening symptoms as discussed prior to discharge